# Patient Record
Sex: FEMALE | Race: WHITE | Employment: STUDENT | URBAN - NONMETROPOLITAN AREA
[De-identification: names, ages, dates, MRNs, and addresses within clinical notes are randomized per-mention and may not be internally consistent; named-entity substitution may affect disease eponyms.]

---

## 2024-05-09 ENCOUNTER — OFFICE VISIT (OUTPATIENT)
Age: 24
End: 2024-05-09

## 2024-05-09 VITALS
WEIGHT: 150 LBS | HEART RATE: 72 BPM | RESPIRATION RATE: 16 BRPM | BODY MASS INDEX: 24.99 KG/M2 | HEIGHT: 65 IN | OXYGEN SATURATION: 98 % | SYSTOLIC BLOOD PRESSURE: 120 MMHG | DIASTOLIC BLOOD PRESSURE: 80 MMHG | TEMPERATURE: 98.8 F

## 2024-05-09 DIAGNOSIS — N39.0 ACUTE URINARY TRACT INFECTION: Primary | ICD-10-CM

## 2024-05-09 PROBLEM — J45.20 MILD INTERMITTENT ASTHMA WITHOUT COMPLICATION: Status: ACTIVE | Noted: 2024-01-09

## 2024-05-09 LAB
BILIRUBIN, POC: NEGATIVE
BLOOD URINE, POC: ABNORMAL
CLARITY, POC: CLEAR
COLOR, POC: ABNORMAL
GLUCOSE URINE, POC: NEGATIVE
KETONES, POC: ABNORMAL
LEUKOCYTE EST, POC: ABNORMAL
NITRITE, POC: POSITIVE
PH, POC: 5.5
PROTEIN, POC: 30
SPECIFIC GRAVITY, POC: 1.03
UROBILINOGEN, POC: 0.2

## 2024-05-09 PROCEDURE — 99213 OFFICE O/P EST LOW 20 MIN: CPT | Performed by: NURSE PRACTITIONER

## 2024-05-09 PROCEDURE — 81003 URINALYSIS AUTO W/O SCOPE: CPT | Performed by: NURSE PRACTITIONER

## 2024-05-09 RX ORDER — CETIRIZINE HYDROCHLORIDE 10 MG/1
10 TABLET ORAL DAILY
COMMUNITY

## 2024-05-09 RX ORDER — NITROFURANTOIN 25; 75 MG/1; MG/1
100 CAPSULE ORAL 2 TIMES DAILY
Qty: 10 CAPSULE | Refills: 0 | Status: SHIPPED | OUTPATIENT
Start: 2024-05-09 | End: 2024-05-14

## 2024-05-09 RX ORDER — ALBUTEROL SULFATE 90 UG/1
2 AEROSOL, METERED RESPIRATORY (INHALATION) EVERY 4 HOURS PRN
COMMUNITY
Start: 2017-12-14

## 2024-05-09 RX ORDER — BUDESONIDE 0.25 MG/2ML
0.25 INHALANT ORAL
COMMUNITY
Start: 2024-01-12

## 2024-05-09 SDOH — ECONOMIC STABILITY: FOOD INSECURITY: WITHIN THE PAST 12 MONTHS, YOU WORRIED THAT YOUR FOOD WOULD RUN OUT BEFORE YOU GOT MONEY TO BUY MORE.: NEVER TRUE

## 2024-05-09 SDOH — ECONOMIC STABILITY: FOOD INSECURITY: WITHIN THE PAST 12 MONTHS, THE FOOD YOU BOUGHT JUST DIDN'T LAST AND YOU DIDN'T HAVE MONEY TO GET MORE.: NEVER TRUE

## 2024-05-09 SDOH — ECONOMIC STABILITY: HOUSING INSECURITY
IN THE LAST 12 MONTHS, WAS THERE A TIME WHEN YOU DID NOT HAVE A STEADY PLACE TO SLEEP OR SLEPT IN A SHELTER (INCLUDING NOW)?: NO

## 2024-05-09 SDOH — ECONOMIC STABILITY: INCOME INSECURITY: HOW HARD IS IT FOR YOU TO PAY FOR THE VERY BASICS LIKE FOOD, HOUSING, MEDICAL CARE, AND HEATING?: NOT HARD AT ALL

## 2024-05-09 ASSESSMENT — PATIENT HEALTH QUESTIONNAIRE - PHQ9
1. LITTLE INTEREST OR PLEASURE IN DOING THINGS: NOT AT ALL
SUM OF ALL RESPONSES TO PHQ QUESTIONS 1-9: 0
2. FEELING DOWN, DEPRESSED OR HOPELESS: NOT AT ALL
SUM OF ALL RESPONSES TO PHQ9 QUESTIONS 1 & 2: 0
SUM OF ALL RESPONSES TO PHQ QUESTIONS 1-9: 0

## 2024-05-09 ASSESSMENT — ENCOUNTER SYMPTOMS
COUGH: 0
WHEEZING: 0
CHEST TIGHTNESS: 0
VOMITING: 0
SHORTNESS OF BREATH: 0
DIARRHEA: 0
NAUSEA: 0

## 2024-05-09 NOTE — PROGRESS NOTES
[facility] [place] [facility]  [facility] - [facility]  [address] [facility] [address]  Dept: [phone]  Loc: [phone]     Radha Vieira is a 23 y.o. female who presents today for:  Chief Complaint   Patient presents with    Urinary Pain       Assessment/Plan:     1. Acute urinary tract infection  -UA consistent with UTI.  No CVA tenderness.  Macrobid rx, no recent antibiotics.  -Follow up if there's no improvement in symptoms in 48-72 hours.  Seek higher level of care if develop fever, back pain, vomiting, severe chills, or blood in your urine.  Increase intake of clear liquids.   - Culture, Urine         Results for orders placed or performed in visit on 05/09/24   POCT Urinalysis No Micro (Auto)   Result Value Ref Range    Color, UA dark yellow     Clarity, UA clear     Glucose, UA POC negative     Bilirubin, UA negative     Ketones, UA trace     Spec Grav, UA 1.030     Blood, UA POC small     pH, UA 5.5     Protein, UA POC 30     Urobilinogen, UA 0.2     Leukocytes, UA trace     Nitrite, UA positive         Medications Ordered:  Orders Placed This Encounter   Medications    nitrofurantoin, macrocrystal-monohydrate, (MACROBID) 100 MG capsule     Sig: Take 1 capsule by mouth 2 times daily for 5 days     Dispense:  10 capsule     Refill:  0       No follow-ups on file.    No future appointments.    HPI:   Pt presents with dysuria, urgency, and hesitancy x2 days.  Denies fever, back pain, hematuria, and N/V.  No abnormal vaginal discharge or genital rashes/lesions.  Denies chance of pregnancy and STI, declines testing.  Sxs are intermittent but becoming more frequent.  Most recent UTI was 1 year ago.    Urinary Pain   This is a new problem. The current episode started in the past 7 days. The problem has been gradually worsening. There has been no fever. Associated symptoms include hesitancy and urgency. Pertinent negatives include no chills, discharge, flank pain,

## 2024-05-11 LAB
BACTERIA UR CULT: ABNORMAL
ORGANISM: ABNORMAL

## 2024-10-08 ENCOUNTER — OFFICE VISIT (OUTPATIENT)
Age: 24
End: 2024-10-08

## 2024-10-08 VITALS
HEIGHT: 66 IN | WEIGHT: 155 LBS | DIASTOLIC BLOOD PRESSURE: 80 MMHG | RESPIRATION RATE: 18 BRPM | BODY MASS INDEX: 24.91 KG/M2 | HEART RATE: 80 BPM | OXYGEN SATURATION: 99 % | TEMPERATURE: 98.2 F | SYSTOLIC BLOOD PRESSURE: 116 MMHG

## 2024-10-08 DIAGNOSIS — J06.9 ACUTE UPPER RESPIRATORY INFECTION: Primary | ICD-10-CM

## 2024-10-08 PROCEDURE — 99213 OFFICE O/P EST LOW 20 MIN: CPT | Performed by: NURSE PRACTITIONER

## 2024-10-08 ASSESSMENT — ENCOUNTER SYMPTOMS
DIARRHEA: 0
VOMITING: 0
CHEST TIGHTNESS: 0
SHORTNESS OF BREATH: 0
NAUSEA: 0
SINUS COMPLAINT: 1
SORE THROAT: 0
SINUS PRESSURE: 1
ABDOMINAL PAIN: 0
COUGH: 0
WHEEZING: 0

## 2024-10-08 NOTE — PROGRESS NOTES
[facility] [place] [facility]  [facility] [place] [address] [address] [facility] [address]  Dept: [phone]  Loc: [phone]     Radha Vieira is a 24 y.o. female who presents today for:  Chief Complaint   Patient presents with    Sinus Problem       Assessment/Plan:     1. Acute upper respiratory infection  -declines covid test.  Follow up if no improvement after 7-10 days.  Over the counter medications such as Mucinex, Flonase, Tylenol, ibuprofen, Robitussin, and nasal saline will improve your symptoms.        No results found for any visits on 10/08/24.     Medications Ordered:  No orders of the defined types were placed in this encounter.      No follow-ups on file.    No future appointments.    HPI:   Pt presents with drainage and congestion x2-3 days.  Mucous is purulent.  Denies fever, cough, and congestion.  Has taken Mucinex with minimal relief.    Sinus Problem  This is a new problem. The current episode started in the past 7 days. The problem has been gradually worsening since onset. There has been no fever. Associated symptoms include congestion, sinus pressure and sneezing. Pertinent negatives include no chills, coughing, ear pain, headaches, shortness of breath or sore throat. Past treatments include oral decongestants. The treatment provided mild relief.         Subjective:      Review of Systems   Constitutional:  Negative for chills and fever.   HENT:  Positive for congestion, postnasal drip, sinus pressure and sneezing. Negative for ear pain and sore throat.    Respiratory:  Negative for cough, chest tightness, shortness of breath and wheezing.    Cardiovascular:  Negative for chest pain and palpitations.   Gastrointestinal:  Negative for abdominal pain, diarrhea, nausea and vomiting.   Musculoskeletal:  Negative for myalgias.   Neurological:  Negative for dizziness, light-headedness and headaches.         Objective:     Vitals:    10/08/24 0945   BP:

## 2024-10-10 ENCOUNTER — OFFICE VISIT (OUTPATIENT)
Age: 24
End: 2024-10-10

## 2024-10-10 ENCOUNTER — TELEPHONE (OUTPATIENT)
Age: 24
End: 2024-10-10

## 2024-10-10 VITALS
OXYGEN SATURATION: 99 % | SYSTOLIC BLOOD PRESSURE: 118 MMHG | TEMPERATURE: 97.5 F | HEART RATE: 71 BPM | DIASTOLIC BLOOD PRESSURE: 78 MMHG | RESPIRATION RATE: 16 BRPM

## 2024-10-10 DIAGNOSIS — J06.9 ACUTE UPPER RESPIRATORY INFECTION: Primary | ICD-10-CM

## 2024-10-10 PROCEDURE — 99213 OFFICE O/P EST LOW 20 MIN: CPT | Performed by: NURSE PRACTITIONER

## 2024-10-10 RX ORDER — MONTELUKAST SODIUM 10 MG/1
10 TABLET ORAL DAILY
COMMUNITY
Start: 2024-10-02

## 2024-10-10 RX ORDER — ALBUTEROL SULFATE 90 UG/1
2 INHALANT RESPIRATORY (INHALATION) EVERY 4 HOURS PRN
Qty: 18 G | Refills: 0 | Status: SHIPPED | OUTPATIENT
Start: 2024-10-10

## 2024-10-10 RX ORDER — PREDNISONE 20 MG/1
20 TABLET ORAL DAILY
Qty: 5 TABLET | Refills: 0 | Status: SHIPPED | OUTPATIENT
Start: 2024-10-10 | End: 2024-10-15

## 2024-10-10 RX ORDER — DOXYCYCLINE 100 MG/1
100 CAPSULE ORAL 2 TIMES DAILY
COMMUNITY
Start: 2024-10-07

## 2024-10-10 ASSESSMENT — ENCOUNTER SYMPTOMS
WHEEZING: 1
CHEST TIGHTNESS: 0
SINUS PRESSURE: 1
COUGH: 1
SORE THROAT: 0
SHORTNESS OF BREATH: 1

## 2024-10-10 NOTE — TELEPHONE ENCOUNTER
Pt seen on 10/9/2024 for URI. No med prescribed for patient. She states she is feeling a lot worst. She has new symptoms of productive cough and ear pain. Her original symptoms have gotten worst. Pt is taking Mucinex and Flonase with no relief. Pt has hx of Asthma. Pt asking for antibiotic and steroid due to being in sports.

## 2024-10-10 NOTE — PROGRESS NOTES
[facility] [place] [facility]  [facility]  [address] [facility] [address]  Dept: [phone]  Loc: [phone]     Radha Vieira is a 24 y.o. female who presents today for:  Chief Complaint   Patient presents with    Otalgia    Congestion    Headache    Cough    Drainage       Assessment/Plan:     1. Acute upper respiratory infection  -pt declines covid test.  Cough noted.  Respirations unlabored.  Lungs CTA.  -pt is requesting steroids and a refill of rescue inhaler.  Last steroids 1 year ago  -use inhaler every 4 hours for next 24-48 hrs then as needed.  Follow up if no improvement in symptoms in 48-72 hours.  seek higher level of care if shortness of breath worsens or is not relieved by inhaler or develop wheezing, trouble breathing, or chest pain.  Follow up ASAP if develop fever or pain with breathing.        No results found for any visits on 10/10/24.     Medications Ordered:  Orders Placed This Encounter   Medications    albuterol sulfate HFA (VENTOLIN HFA) 108 (90 Base) MCG/ACT inhaler     Sig: Inhale 2 puffs into the lungs every 4 hours as needed for Wheezing or Shortness of Breath     Dispense:  18 g     Refill:  0    predniSONE (DELTASONE) 20 MG tablet     Sig: Take 1 tablet by mouth daily for 5 days     Dispense:  5 tablet     Refill:  0       No follow-ups on file.    No future appointments.    HPI:   Pt presents as f/u to appt on 10/8/2024.  She states sxs have worsened, now has productive cough.  Reports SOB last night during game, none currently.  Also had some wheezing last night.  Has h/o asthma.  Has been using rescue inhaler with some relief- used last night prior to game and at half time.  Denies pleuritic pain and fever.  Phlegm is yellow and thick.  She states \"I try to get on top of it because I have asthma.\"    Cough  This is a new problem. The current episode started yesterday. The cough is Productive of purulent sputum. Associated

## 2024-10-10 NOTE — PATIENT INSTRUCTIONS
Use inhaler every 4 hours for next 24-48 hrs then as needed.  Follow up if no improvement in symptoms in 48-72 hours.  seek higher level of care if shortness of breath worsens or is not relieved by inhaler or develop wheezing, trouble breathing, or chest pain.  Follow up ASAP if develop fever or pain with breathing.